# Patient Record
Sex: MALE | Race: WHITE | NOT HISPANIC OR LATINO | RURAL
[De-identification: names, ages, dates, MRNs, and addresses within clinical notes are randomized per-mention and may not be internally consistent; named-entity substitution may affect disease eponyms.]

---

## 2019-06-23 ENCOUNTER — OFFICE VISIT (OUTPATIENT)
Dept: RETAIL CLINIC | Facility: CLINIC | Age: 44
End: 2019-06-23

## 2019-06-23 VITALS
RESPIRATION RATE: 15 BRPM | HEART RATE: 80 BPM | HEIGHT: 73 IN | DIASTOLIC BLOOD PRESSURE: 75 MMHG | SYSTOLIC BLOOD PRESSURE: 118 MMHG | BODY MASS INDEX: 38.83 KG/M2 | OXYGEN SATURATION: 99 % | TEMPERATURE: 98.8 F | WEIGHT: 293 LBS

## 2019-06-23 DIAGNOSIS — H66.92 CHRONIC OTITIS MEDIA OF LEFT EAR WITH PERFORATED TYMPANIC MEMBRANE: ICD-10-CM

## 2019-06-23 DIAGNOSIS — H66.91 RIGHT OTITIS MEDIA, UNSPECIFIED OTITIS MEDIA TYPE: Primary | ICD-10-CM

## 2019-06-23 DIAGNOSIS — H72.92 CHRONIC OTITIS MEDIA OF LEFT EAR WITH PERFORATED TYMPANIC MEMBRANE: ICD-10-CM

## 2019-06-23 PROCEDURE — 99213 OFFICE O/P EST LOW 20 MIN: CPT | Performed by: NURSE PRACTITIONER

## 2019-06-23 RX ORDER — AMOXICILLIN 500 MG/1
1000 TABLET, FILM COATED ORAL 2 TIMES DAILY
Qty: 40 TABLET | Refills: 0 | Status: SHIPPED | OUTPATIENT
Start: 2019-06-23 | End: 2019-07-03

## 2019-06-23 RX ORDER — LORATADINE 10 MG/1
10 CAPSULE, LIQUID FILLED ORAL
COMMUNITY

## 2019-06-23 RX ORDER — BISOPROLOL FUMARATE AND HYDROCHLOROTHIAZIDE 10; 6.25 MG/1; MG/1
1 TABLET ORAL DAILY
Refills: 3 | COMMUNITY
Start: 2019-05-31

## 2019-06-23 RX ORDER — FLUTICASONE PROPIONATE 50 MCG
2 SPRAY, SUSPENSION (ML) NASAL NIGHTLY
Qty: 1 BOTTLE | Refills: 0 | Status: SHIPPED | OUTPATIENT
Start: 2019-06-23 | End: 2019-07-23

## 2019-06-23 RX ORDER — MONTELUKAST SODIUM 10 MG/1
10 TABLET ORAL NIGHTLY
Qty: 30 TABLET | Refills: 0 | Status: SHIPPED | OUTPATIENT
Start: 2019-06-23

## 2019-06-23 RX ORDER — ALLOPURINOL 300 MG/1
300 TABLET ORAL DAILY
Refills: 0 | COMMUNITY
Start: 2019-04-08

## 2019-06-23 NOTE — PROGRESS NOTES
Subjective   Minh Millan is a 43 y.o. male.     PT states that he has had some sinus pressure and then he has had serous drainage from his left ear that he fears is a perforation.       Sinusitis   This is a new problem. The current episode started yesterday. The problem has been gradually worsening since onset. There has been no fever. The pain is moderate. Associated symptoms include congestion, coughing, ear pain, a hoarse voice, sinus pressure and a sore throat. Pertinent negatives include no chills, diaphoresis, headaches, neck pain, shortness of breath, sneezing or swollen glands. Past treatments include nothing. The treatment provided no relief.   Earache    There is pain in the left ear. This is a new problem. The current episode started yesterday (last night t 8pm). The problem occurs constantly. The problem has been gradually worsening. There has been no fever. The pain is moderate. Associated symptoms include coughing, hearing loss (muffled), rhinorrhea and a sore throat. Pertinent negatives include no abdominal pain, diarrhea, ear discharge, headaches, neck pain, rash or vomiting. He has tried acetaminophen and NSAIDs (mucinex DM) for the symptoms. The treatment provided no relief. His past medical history is significant for a chronic ear infection. There is no history of hearing loss or a tympanostomy tube.        Current Outpatient Medications on File Prior to Visit   Medication Sig Dispense Refill   • allopurinol (ZYLOPRIM) 300 MG tablet Take 300 mg by mouth Daily.  0   • bisoprolol-hydrochlorothiazide (ZIAC) 10-6.25 MG per tablet Take 1 tablet by mouth Daily. For blood pressure  3   • Loratadine 10 MG capsule Take 10 mg by mouth.       No current facility-administered medications on file prior to visit.        No Known Allergies    Past Medical History:   Diagnosis Date   • Allergic    • Gout    • Hypertension        History reviewed. No pertinent surgical history.    History reviewed. No pertinent  "family history.    Social History     Socioeconomic History   • Marital status: Unknown     Spouse name: Not on file   • Number of children: Not on file   • Years of education: Not on file   • Highest education level: Not on file   Tobacco Use   • Smoking status: Never Smoker   Substance and Sexual Activity   • Alcohol use: No     Frequency: Never   • Drug use: No       Review of Systems   Constitutional: Positive for fatigue. Negative for activity change, appetite change, chills, diaphoresis and fever.   HENT: Positive for congestion, ear pain, hearing loss (muffled), hoarse voice, postnasal drip, rhinorrhea, sinus pressure, sinus pain, sore throat and voice change. Negative for ear discharge and sneezing.    Eyes: Positive for itching. Negative for pain, discharge and redness.   Respiratory: Positive for cough. Negative for chest tightness and shortness of breath.    Cardiovascular: Negative for chest pain.   Gastrointestinal: Negative for abdominal pain, diarrhea, nausea and vomiting.   Musculoskeletal: Negative for arthralgias, myalgias and neck pain.   Skin: Negative for rash.   Allergic/Immunologic: Positive for environmental allergies.   Neurological: Negative for dizziness, light-headedness and headaches.   Psychiatric/Behavioral: Negative for agitation.       /75   Pulse 80   Temp 98.8 °F (37.1 °C) (Oral)   Resp 15   Ht 185.4 cm (73\")   Wt 133 kg (293 lb)   SpO2 99%   BMI 38.66 kg/m²     Objective   Physical Exam   Constitutional: He is oriented to person, place, and time. He appears well-developed and well-nourished. He is cooperative. He appears ill.   HENT:   Head: Normocephalic and atraumatic.   Right Ear: External ear and ear canal normal. There is swelling. Tympanic membrane is injected, erythematous and bulging.   Left Ear: External ear and ear canal normal. There is drainage (serous- clear), swelling and tenderness. Tympanic membrane is injected, perforated and erythematous.   Nose: " Mucosal edema and rhinorrhea present. Right sinus exhibits frontal sinus tenderness. Right sinus exhibits no maxillary sinus tenderness. Left sinus exhibits frontal sinus tenderness. Left sinus exhibits no maxillary sinus tenderness.   Mouth/Throat: Uvula is midline and mucous membranes are normal. Oropharyngeal exudate (clear drainage) present. No posterior oropharyngeal edema or posterior oropharyngeal erythema. Tonsils are 0 on the right. Tonsils are 0 on the left. No tonsillar exudate.   Eyes: Conjunctivae and lids are normal.   Cardiovascular: Normal heart sounds.   Pulmonary/Chest: Effort normal and breath sounds normal.   Abdominal: There is no tenderness.   Lymphadenopathy:     He has cervical adenopathy.   Neurological: He is alert and oriented to person, place, and time.   Skin: Skin is warm and dry. No rash noted.   Psychiatric: He has a normal mood and affect. His speech is normal and behavior is normal.       Procedures None    Assessment/Plan   Minh was seen today for sinusitis and earache.    Diagnoses and all orders for this visit:    Right otitis media, unspecified otitis media type    Chronic otitis media of left ear with perforated tympanic membrane    Other orders  -     fluticasone (FLONASE) 50 MCG/ACT nasal spray; 2 sprays into the nostril(s) as directed by provider Every Night for 30 days. Administer 2 sprays in each nostril for each dose.  -     amoxicillin (AMOXIL) 500 MG tablet; Take 2 tablets by mouth 2 (Two) Times a Day for 10 days.  -     montelukast (SINGULAIR) 10 MG tablet; Take 1 tablet by mouth Every Night.        No results found for this or any previous visit.  -PT notified to f/u with PCP in 1-2 weeks to recheck left ear  Follow up with PCP or go to the nearest emergency room if symptoms worsen or fail to improve.

## 2019-06-23 NOTE — PATIENT INSTRUCTIONS
Eardrum Rupture, Adult  An eardrum rupture is a hole (perforation) in the eardrum. The eardrum is a thin, round tissue inside of the ear that separates the ear canal from the middle ear. The eardrum is also called the tympanic membrane. It transfers sound vibrations through small bones in the middle ear to the hearing nerve in the inner ear. It also protects the middle ear from germs. An eardrum rupture can cause pain and hearing loss.  What are the causes?  This condition may be caused by:  · An infection.  · A sudden injury, such as from:  ? Inserting a thin, sharp object into the ear.  ? A hit to the side of the head, especially by an open hand.  ? Falling onto water or a flat surface.  ? A rapid change in pressure, such as from flying or scuba diving.  ? A sudden increase in pressure against the eardrum, such as from an explosion or a very loud noise.  · Inserting a cotton-tipped swab in the ear.  · A long-term eustachian tube disorder. Eustachian tubes are parts of the body that connect each middle ear space to the back of the nose.  · A medical procedure or surgery, such as a procedure to remove wax from the ear canal.  · Removing a man-made pressure equalization tube(PE tube) that was placed through the eardrum.  · Having a PE tube fall out.    What increases the risk?  You are more likely to develop this condition if:  · You have had PE tubes inserted in your ears.  · You have an ear infection.  · You play sports that:  ? Involve balls or contact with other players.  ? Take place in water, such as diving, scuba diving, or waterskiing.    What are the signs or symptoms?  Symptoms of this condition include:  · Sudden pain at the time of the injury.  · Ear pain that suddenly improves.  · Ringing in the ear after the injury.  · Drainage from the ear. The drainage may be clear, cloudy or pus-like, or bloody.  · Hearing loss.  · Dizziness.    How is this diagnosed?  This condition is diagnosed based on your  symptoms and medical history as well as a physical exam. Your health care provider can usually see a perforation using an ear scope (otoscope). You may have tests, such as:  · A hearing test (audiogram) to check for hearing loss.  · A test in which a sample of ear drainage is tested for infection (culture).    How is this treated?  An eardrum typically heals on its own within a few weeks. If your eardrum does not heal, your health care provider may recommend a procedure to place a patch over your eardrum or surgery to repair your eardrum. Your health care provider may also prescribe antibiotic medicines to help prevent infection.  If the ear heals completely, any hearing loss should be temporary.  Follow these instructions at home:  · Keep your ear dry. This is very important. Follow instructions from your health care provider about how to keep your ear dry. You may need to wear waterproof earplugs when bathing and swimming.  · Take over-the-counter and prescription medicines only as told by your health care provider.  · Return to sports and activities as told by your health care provider. Ask your health care provider what activities are safe for you.  · Wear headgear with ear protection when you play sports in which ear injuries are common.  · If directed, apply heat to your affected ear as often as told by your health care provider. Use the heat source that your health care provider recommends, such as a moist heat pack or a heating pad. This will help to relieve pain.  ? Place a towel between your skin and the heat source.  ? Leave the heat on for 20-30 minutes.  ? Remove the heat if your skin turns bright red. This is especially important if you are unable to feel pain, heat, or cold. You may have a greater risk of getting burned.  · Keep all follow-up visits as told by your health care provider. This is important.  · Talk to your health care provider before traveling by plane.  Contact a health care provider  if:  · You have mucus or blood draining from your ear.  · You have a fever.  · You have ear pain.  · You have hearing loss, dizziness, or ringing in your ear.  Get help right away if:  · You have sudden hearing loss.  · You are very dizzy.  · You have severe ear pain.  · Your face feels weak or becomes paralyzed.  Summary  · An eardrum rupture is a hole (perforation) in the eardrum that can cause pain and hearing loss. It is usually caused by a sudden injury to the ear.  · The eardrum will likely heal on its own within a few weeks. In some cases, surgery may be necessary.  · After the injury, follow instructions from your health care provider about how to keep your ear dry as it heals.  This information is not intended to replace advice given to you by your health care provider. Make sure you discuss any questions you have with your health care provider.  Document Released: 12/15/2001 Document Revised: 02/23/2018 Document Reviewed: 02/23/2018  [employer] Patient Education © 2019 [employer]    --Please utilize tylenol or ibuprofen as needed for fever or pain. Use as recommended.   -- Noted 8-10 eight ounce glasses of fluid a day. Nonalcoholic and noncafeinated beverages  -Use mucinex or equivalent over the counter medicine for congestion.